# Patient Record
Sex: FEMALE | Race: BLACK OR AFRICAN AMERICAN | Employment: FULL TIME | URBAN - METROPOLITAN AREA
[De-identification: names, ages, dates, MRNs, and addresses within clinical notes are randomized per-mention and may not be internally consistent; named-entity substitution may affect disease eponyms.]

---

## 2022-11-07 ENCOUNTER — HOSPITAL ENCOUNTER (OUTPATIENT)
Age: 27
Discharge: HOME | End: 2022-11-07
Payer: COMMERCIAL

## 2022-11-07 ENCOUNTER — APPOINTMENT (OUTPATIENT)
Dept: GENERAL RADIOLOGY | Age: 27
End: 2022-11-07
Attending: PHYSICIAN ASSISTANT
Payer: COMMERCIAL

## 2022-11-07 VITALS
BODY MASS INDEX: 33.49 KG/M2 | DIASTOLIC BLOOD PRESSURE: 85 MMHG | OXYGEN SATURATION: 100 % | TEMPERATURE: 98 F | HEIGHT: 65 IN | WEIGHT: 201 LBS | SYSTOLIC BLOOD PRESSURE: 144 MMHG | RESPIRATION RATE: 16 BRPM | HEART RATE: 99 BPM

## 2022-11-07 DIAGNOSIS — J06.9 VIRAL URI: Primary | ICD-10-CM

## 2022-11-07 PROCEDURE — 99203 OFFICE O/P NEW LOW 30 MIN: CPT

## 2022-11-07 PROCEDURE — 71046 X-RAY EXAM CHEST 2 VIEWS: CPT | Performed by: PHYSICIAN ASSISTANT

## 2022-11-07 RX ORDER — ERGOCALCIFEROL (VITAMIN D2) 1250 MCG
50000 CAPSULE ORAL
COMMUNITY
Start: 2021-01-15

## 2022-11-07 RX ORDER — DROSPIRENONE AND ETHINYL ESTRADIOL 0.02-3(28)
1 KIT ORAL DAILY
COMMUNITY
Start: 2022-08-29

## 2022-11-07 RX ORDER — MINOCYCLINE HYDROCHLORIDE 100 MG/1
100 CAPSULE ORAL 2 TIMES DAILY
COMMUNITY
Start: 2022-05-05

## 2022-11-07 RX ORDER — FERROUS SULFATE 325(65) MG
325 TABLET ORAL 2 TIMES DAILY
COMMUNITY

## 2022-11-07 RX ORDER — SPIRONOLACTONE 50 MG/1
50 TABLET, FILM COATED ORAL DAILY
COMMUNITY
Start: 2022-05-05

## 2022-11-07 RX ORDER — CHLORHEXIDINE GLUCONATE 4 G/100ML
1 SOLUTION TOPICAL
COMMUNITY
Start: 2021-05-13

## 2022-11-07 RX ORDER — CYCLOBENZAPRINE HCL 5 MG
TABLET ORAL
COMMUNITY
Start: 2022-07-27

## 2022-11-07 NOTE — DISCHARGE INSTRUCTIONS
Please return to the Emergency department/clinic if symptoms worsen or you develop new symptoms. Follow up with your primary care physician in 2 days. Take any medications prescribed to you as instructed. You have been diagnosed with a viral illness. This illness is self-limited, meaning your immune system will eliminate the virus on it's own. It is important to give your immune system the tools it needs to fight the illness - this includes drinking plenty of water and getting plenty of rest.         Medicine will help you feel better including ibuprofen and tylenol for fevers and body aches. If you decide to take a cold medications such as DayQuil or NyQuil, you may want to add in an NSAID such as ibuprofen, as these typically contain acetaminophen (tylneol) which is not always as helpful for pain/fever control as NSAIDs.